# Patient Record
Sex: MALE | Race: OTHER | HISPANIC OR LATINO
[De-identification: names, ages, dates, MRNs, and addresses within clinical notes are randomized per-mention and may not be internally consistent; named-entity substitution may affect disease eponyms.]

---

## 2021-06-07 ENCOUNTER — LABORATORY RESULT (OUTPATIENT)
Age: 66
End: 2021-06-07

## 2021-06-07 ENCOUNTER — NON-APPOINTMENT (OUTPATIENT)
Age: 66
End: 2021-06-07

## 2021-06-07 ENCOUNTER — APPOINTMENT (OUTPATIENT)
Dept: CARDIOLOGY | Facility: CLINIC | Age: 66
End: 2021-06-07
Payer: MEDICAID

## 2021-06-07 VITALS
HEART RATE: 84 BPM | SYSTOLIC BLOOD PRESSURE: 120 MMHG | HEIGHT: 66 IN | WEIGHT: 180 LBS | BODY MASS INDEX: 28.93 KG/M2 | TEMPERATURE: 97.6 F | DIASTOLIC BLOOD PRESSURE: 70 MMHG | OXYGEN SATURATION: 97 %

## 2021-06-07 DIAGNOSIS — I10 ESSENTIAL (PRIMARY) HYPERTENSION: ICD-10-CM

## 2021-06-07 DIAGNOSIS — Z00.00 ENCOUNTER FOR GENERAL ADULT MEDICAL EXAMINATION W/OUT ABNORMAL FINDINGS: ICD-10-CM

## 2021-06-07 PROCEDURE — 99204 OFFICE O/P NEW MOD 45 MIN: CPT

## 2021-06-07 PROCEDURE — 93000 ELECTROCARDIOGRAM COMPLETE: CPT

## 2021-06-07 PROCEDURE — 36415 COLL VENOUS BLD VENIPUNCTURE: CPT

## 2021-06-08 LAB
ALBUMIN SERPL ELPH-MCNC: 4.4 G/DL
ALP BLD-CCNC: 76 U/L
ALT SERPL-CCNC: 17 U/L
ANION GAP SERPL CALC-SCNC: 12 MMOL/L
AST SERPL-CCNC: 19 U/L
BASOPHILS # BLD AUTO: 0.19 K/UL
BASOPHILS NFR BLD AUTO: 1.7 %
BILIRUB SERPL-MCNC: 0.4 MG/DL
BUN SERPL-MCNC: 15 MG/DL
CALCIUM SERPL-MCNC: 9.7 MG/DL
CHLORIDE SERPL-SCNC: 103 MMOL/L
CHOLEST SERPL-MCNC: 174 MG/DL
CO2 SERPL-SCNC: 22 MMOL/L
CREAT SERPL-MCNC: 0.7 MG/DL
EOSINOPHIL # BLD AUTO: 3.05 K/UL
EOSINOPHIL NFR BLD AUTO: 27.3 %
ESTIMATED AVERAGE GLUCOSE: 114 MG/DL
GLUCOSE SERPL-MCNC: 82 MG/DL
HBA1C MFR BLD HPLC: 5.6 %
HCT VFR BLD CALC: 45.8 %
HDLC SERPL-MCNC: 47 MG/DL
HGB BLD-MCNC: 14.8 G/DL
LDLC SERPL CALC-MCNC: 96 MG/DL
LYMPHOCYTES # BLD AUTO: 2.77 K/UL
LYMPHOCYTES NFR BLD AUTO: 24.8 %
MAN DIFF?: NORMAL
MCHC RBC-ENTMCNC: 30 PG
MCHC RBC-ENTMCNC: 32.3 GM/DL
MCV RBC AUTO: 92.9 FL
MONOCYTES # BLD AUTO: 0.48 K/UL
MONOCYTES NFR BLD AUTO: 4.3 %
NEUTROPHILS # BLD AUTO: 4.68 K/UL
NEUTROPHILS NFR BLD AUTO: 41.9 %
NONHDLC SERPL-MCNC: 127 MG/DL
PLATELET # BLD AUTO: 264 K/UL
POTASSIUM SERPL-SCNC: 4.2 MMOL/L
PROT SERPL-MCNC: 7.6 G/DL
RBC # BLD: 4.93 M/UL
RBC # FLD: 13.2 %
SODIUM SERPL-SCNC: 137 MMOL/L
T4 SERPL-MCNC: 4.8 UG/DL
TRIGL SERPL-MCNC: 156 MG/DL
TSH SERPL-ACNC: 1.3 UIU/ML
WBC # FLD AUTO: 11.17 K/UL

## 2023-03-15 ENCOUNTER — LABORATORY RESULT (OUTPATIENT)
Age: 68
End: 2023-03-15

## 2023-03-15 ENCOUNTER — NON-APPOINTMENT (OUTPATIENT)
Age: 68
End: 2023-03-15

## 2023-03-15 ENCOUNTER — APPOINTMENT (OUTPATIENT)
Dept: INTERNAL MEDICINE | Facility: CLINIC | Age: 68
End: 2023-03-15
Payer: COMMERCIAL

## 2023-03-15 VITALS
OXYGEN SATURATION: 99 % | DIASTOLIC BLOOD PRESSURE: 80 MMHG | SYSTOLIC BLOOD PRESSURE: 122 MMHG | WEIGHT: 180 LBS | HEART RATE: 64 BPM | RESPIRATION RATE: 16 BRPM | TEMPERATURE: 98 F | HEIGHT: 66 IN | BODY MASS INDEX: 28.93 KG/M2

## 2023-03-15 DIAGNOSIS — Z12.5 ENCOUNTER FOR SCREENING FOR MALIGNANT NEOPLASM OF PROSTATE: ICD-10-CM

## 2023-03-15 DIAGNOSIS — N40.1 BENIGN PROSTATIC HYPERPLASIA WITH LOWER URINARY TRACT SYMPMS: ICD-10-CM

## 2023-03-15 DIAGNOSIS — Z00.00 ENCOUNTER FOR GENERAL ADULT MEDICAL EXAMINATION W/OUT ABNORMAL FINDINGS: ICD-10-CM

## 2023-03-15 DIAGNOSIS — N13.8 BENIGN PROSTATIC HYPERPLASIA WITH LOWER URINARY TRACT SYMPMS: ICD-10-CM

## 2023-03-15 DIAGNOSIS — Z82.49 FAMILY HISTORY OF ISCHEMIC HEART DISEASE AND OTHER DISEASES OF THE CIRCULATORY SYSTEM: ICD-10-CM

## 2023-03-15 DIAGNOSIS — R35.0 FREQUENCY OF MICTURITION: ICD-10-CM

## 2023-03-15 DIAGNOSIS — R06.83 SNORING: ICD-10-CM

## 2023-03-15 DIAGNOSIS — Z92.29 PERSONAL HISTORY OF OTHER DRUG THERAPY: ICD-10-CM

## 2023-03-15 DIAGNOSIS — Z13.1 ENCOUNTER FOR SCREENING FOR DIABETES MELLITUS: ICD-10-CM

## 2023-03-15 PROCEDURE — 99397 PER PM REEVAL EST PAT 65+ YR: CPT | Mod: 25

## 2023-03-15 PROCEDURE — G0444 DEPRESSION SCREEN ANNUAL: CPT | Mod: 59

## 2023-03-15 PROCEDURE — 36415 COLL VENOUS BLD VENIPUNCTURE: CPT

## 2023-03-15 PROCEDURE — 93000 ELECTROCARDIOGRAM COMPLETE: CPT | Mod: 59

## 2023-03-15 PROCEDURE — 90677 PCV20 VACCINE IM: CPT

## 2023-03-15 PROCEDURE — 99214 OFFICE O/P EST MOD 30 MIN: CPT | Mod: 25

## 2023-03-15 PROCEDURE — G0009: CPT

## 2023-03-15 RX ORDER — SODIUM BICARBONATE 650 MG/1
650 TABLET ORAL DAILY
Refills: 0 | Status: ACTIVE | COMMUNITY

## 2023-03-15 RX ORDER — OMEPRAZOLE 40 MG/1
40 CAPSULE, DELAYED RELEASE ORAL
Qty: 30 | Refills: 0 | Status: ACTIVE | COMMUNITY

## 2023-03-15 RX ORDER — TOLTERODINE TARTRATE 4 MG/1
4 CAPSULE, EXTENDED RELEASE ORAL DAILY
Refills: 0 | Status: ACTIVE | COMMUNITY

## 2023-03-22 ENCOUNTER — NON-APPOINTMENT (OUTPATIENT)
Age: 68
End: 2023-03-22

## 2023-03-22 LAB
ALBUMIN SERPL ELPH-MCNC: 4.6 G/DL
ALP BLD-CCNC: 77 U/L
ALT SERPL-CCNC: 21 U/L
ANION GAP SERPL CALC-SCNC: 12 MMOL/L
APPEARANCE: CLEAR
AST SERPL-CCNC: 18 U/L
BACTERIA UR CULT: NORMAL
BACTERIA: NEGATIVE
BASOPHILS # BLD AUTO: 0.07 K/UL
BASOPHILS NFR BLD AUTO: 0.7 %
BILIRUB SERPL-MCNC: 0.3 MG/DL
BILIRUBIN URINE: NEGATIVE
BLOOD URINE: ABNORMAL
BUN SERPL-MCNC: 13 MG/DL
CALCIUM SERPL-MCNC: 9.6 MG/DL
CHLORIDE SERPL-SCNC: 104 MMOL/L
CHOLEST SERPL-MCNC: 211 MG/DL
CO2 SERPL-SCNC: 23 MMOL/L
COLOR: YELLOW
CREAT SERPL-MCNC: 0.65 MG/DL
EGFR: 105 ML/MIN/1.73M2
EOSINOPHIL # BLD AUTO: 2.62 K/UL
EOSINOPHIL NFR BLD AUTO: 25.2 %
ESTIMATED AVERAGE GLUCOSE: 114 MG/DL
GLUCOSE QUALITATIVE U: NEGATIVE
GLUCOSE SERPL-MCNC: 96 MG/DL
HBA1C MFR BLD HPLC: 5.6 %
HCT VFR BLD CALC: 45.6 %
HDLC SERPL-MCNC: 45 MG/DL
HGB BLD-MCNC: 14.5 G/DL
HYALINE CASTS: 0 /LPF
IMM GRANULOCYTES NFR BLD AUTO: 0.3 %
KETONES URINE: NEGATIVE
LDLC SERPL CALC-MCNC: 134 MG/DL
LEUKOCYTE ESTERASE URINE: NEGATIVE
LYMPHOCYTES # BLD AUTO: 2.89 K/UL
LYMPHOCYTES NFR BLD AUTO: 27.8 %
MAN DIFF?: NORMAL
MCHC RBC-ENTMCNC: 30 PG
MCHC RBC-ENTMCNC: 31.8 GM/DL
MCV RBC AUTO: 94.2 FL
MICROSCOPIC-UA: NORMAL
MONOCYTES # BLD AUTO: 0.54 K/UL
MONOCYTES NFR BLD AUTO: 5.2 %
NEUTROPHILS # BLD AUTO: 4.23 K/UL
NEUTROPHILS NFR BLD AUTO: 40.8 %
NITRITE URINE: NEGATIVE
NONHDLC SERPL-MCNC: 166 MG/DL
PH URINE: 6
PLATELET # BLD AUTO: 193 K/UL
POTASSIUM SERPL-SCNC: 4.6 MMOL/L
PROT SERPL-MCNC: 7.2 G/DL
PROTEIN URINE: NORMAL
PSA FREE FLD-MCNC: 14 %
PSA FREE SERPL-MCNC: 0.13 NG/ML
PSA SERPL-MCNC: 0.98 NG/ML
RBC # BLD: 4.84 M/UL
RBC # FLD: 12.7 %
RED BLOOD CELLS URINE: 2 /HPF
SODIUM SERPL-SCNC: 139 MMOL/L
SPECIFIC GRAVITY URINE: 1.03
SQUAMOUS EPITHELIAL CELLS: 0 /HPF
TRIGL SERPL-MCNC: 159 MG/DL
UROBILINOGEN URINE: NORMAL
WBC # FLD AUTO: 10.38 K/UL
WHITE BLOOD CELLS URINE: 3 /HPF

## 2023-04-13 ENCOUNTER — NON-APPOINTMENT (OUTPATIENT)
Age: 68
End: 2023-04-13

## 2023-04-14 ENCOUNTER — APPOINTMENT (OUTPATIENT)
Dept: HEMATOLOGY ONCOLOGY | Facility: CLINIC | Age: 68
End: 2023-04-14
Payer: COMMERCIAL

## 2023-04-14 ENCOUNTER — RESULT REVIEW (OUTPATIENT)
Age: 68
End: 2023-04-14

## 2023-04-14 VITALS
OXYGEN SATURATION: 97 % | SYSTOLIC BLOOD PRESSURE: 124 MMHG | RESPIRATION RATE: 16 BRPM | WEIGHT: 184 LBS | HEIGHT: 66 IN | HEART RATE: 62 BPM | TEMPERATURE: 97.5 F | BODY MASS INDEX: 29.57 KG/M2 | DIASTOLIC BLOOD PRESSURE: 80 MMHG

## 2023-04-14 PROCEDURE — 99205 OFFICE O/P NEW HI 60 MIN: CPT | Mod: 25

## 2023-04-14 PROCEDURE — 36415 COLL VENOUS BLD VENIPUNCTURE: CPT

## 2023-04-14 RX ORDER — AMLODIPINE BESYLATE 5 MG/1
5 TABLET ORAL
Refills: 0 | Status: ACTIVE | COMMUNITY

## 2023-04-14 RX ORDER — TAMSULOSIN HYDROCHLORIDE 0.4 MG/1
0.4 CAPSULE ORAL DAILY
Refills: 0 | Status: COMPLETED | COMMUNITY
End: 2023-04-14

## 2023-04-14 RX ORDER — TAMSULOSIN HYDROCHLORIDE 0.4 MG/1
0.4 CAPSULE ORAL
Refills: 0 | Status: ACTIVE | COMMUNITY

## 2023-04-16 NOTE — HISTORY OF PRESENT ILLNESS
[de-identified] : 68 year old male, born in  presents today for initial consultation of eosinophilia, referred by Dr. Margarette Cabrera.  Labs dated 3/22/23 shows Eosinophils 25.2% and absolute Eosinophils 2600.  This has been previously elevated on a CBC dated June 2021.  He reports during the lab draw he had an abdominal rash.  He denies any unexplained weight loss,  lymphadenopathy, or fevers.  He does admit to night sweats x a few months.  He reports no allergies.  He reports WBC back in 2020 over 50 million.  He did see a "blood" specialist in [facility].  When asked if he was diagnosed with a hematological disorder he states he was told he is allergic to Clorox.  \par Family Hx-\par Denies any family hx of malignancies or hematological diseases\par \par Social Hx-\par Former smoker, quit 20 yrs ago- smoked x 30 1-2PPD\par Rarely uses ETOH \par Denies illicit drug use\par Works [employer] as a  \par \par Aleida Dumont: # [id number]\par

## 2023-04-16 NOTE — ASSESSMENT
[FreeTextEntry1] : 68 year old Thai speaking male from DR. \par  Tim [id number].\par Patient referred for evaluation of eosinophilia.  \par # hematology - CBC otherwise WNL\par # Cardiac - Denies SOB, edema\par # CNS, dermatologic, GI denies\par # pulmonary - denies\par # No evidence by history of autoimmune process\par \par labs send out for FIPL1-PDGFR fusion, TCR, flow.\par WIll plan for bone marrow biopsy\par # BPH- Urinary retention, difficulty passing urine. Seen by urologist, given flomax but not compliant\par # GERD- Omeprazole\par \par

## 2023-05-01 ENCOUNTER — APPOINTMENT (OUTPATIENT)
Dept: HEMATOLOGY ONCOLOGY | Facility: CLINIC | Age: 68
End: 2023-05-01
Payer: COMMERCIAL

## 2023-05-01 VITALS
TEMPERATURE: 96.9 F | OXYGEN SATURATION: 96 % | HEIGHT: 66 IN | RESPIRATION RATE: 16 BRPM | WEIGHT: 185.5 LBS | BODY MASS INDEX: 29.81 KG/M2 | DIASTOLIC BLOOD PRESSURE: 76 MMHG | SYSTOLIC BLOOD PRESSURE: 129 MMHG | HEART RATE: 68 BPM

## 2023-05-01 PROCEDURE — 99214 OFFICE O/P EST MOD 30 MIN: CPT | Mod: 25

## 2023-05-01 PROCEDURE — 36415 COLL VENOUS BLD VENIPUNCTURE: CPT

## 2023-05-01 NOTE — ASSESSMENT
[FreeTextEntry1] : 68 year old [place] speaking male from [place] \par  Yanira [id number] \par Patient referred for evaluation of eosinophilia.  \par # hematology - CBC otherwise WNL\par # Cardiac - Denies SOB, edema\par # CNS, dermatologic, GI denies\par # pulmonary - denies\par # No evidence by history of autoimmune process\par \par IgE significant elevation 2354\par \par labs send out for FIPL1-PDGFR fusion, TCR, flow negative mutations\par WIll plan for bone marrow biopsy\par # BPH- Urinary retention, difficulty passing urine. Seen by urologist, given flomax but not compliant\par # GERD- Omeprazole\par \par # reviewed labs - elevate IgE, schedule bone marrow biopsy.

## 2023-05-01 NOTE — HISTORY OF PRESENT ILLNESS
[de-identified] : 68 year old male, born in  presents today for initial consultation of eosinophilia, referred by Dr. Margarette Cabrera.  Labs dated 3/22/23 shows Eosinophils 25.2% and absolute Eosinophils 2600.  This has been previously elevated on a CBC dated June 2021.  He reports during the lab draw he had an abdominal rash.  He denies any unexplained weight loss,  lymphadenopathy, or fevers.  He does admit to night sweats x a few months.  He reports no allergies.  He reports WBC back in 2020 over 50 million.  He did see a "blood" specialist in [facility].  When asked if he was diagnosed with a hematological disorder he states he was told he is allergic to Clorox.  \par Family Hx-\par Denies any family hx of malignancies or hematological diseases\par \par Social Hx-\par Former smoker, quit 20 yrs ago- smoked x 30 1-2PPD\par Rarely uses ETOH \par Denies illicit drug use\par Works FT as a  \par \par Aleida Dumont: # [id number]\par

## 2023-05-08 ENCOUNTER — APPOINTMENT (OUTPATIENT)
Dept: UROLOGY | Facility: CLINIC | Age: 68
End: 2023-05-08

## 2023-05-25 ENCOUNTER — RESULT REVIEW (OUTPATIENT)
Age: 68
End: 2023-05-25

## 2023-05-26 ENCOUNTER — RESULT REVIEW (OUTPATIENT)
Age: 68
End: 2023-05-26

## 2023-05-26 ENCOUNTER — APPOINTMENT (OUTPATIENT)
Dept: HEMATOLOGY ONCOLOGY | Facility: CLINIC | Age: 68
End: 2023-05-26
Payer: COMMERCIAL

## 2023-05-26 VITALS
OXYGEN SATURATION: 96 % | HEIGHT: 65 IN | SYSTOLIC BLOOD PRESSURE: 127 MMHG | WEIGHT: 188 LBS | TEMPERATURE: 97.2 F | RESPIRATION RATE: 16 BRPM | DIASTOLIC BLOOD PRESSURE: 81 MMHG | HEART RATE: 62 BPM | BODY MASS INDEX: 31.32 KG/M2

## 2023-05-26 PROCEDURE — 99214 OFFICE O/P EST MOD 30 MIN: CPT | Mod: 25

## 2023-05-26 PROCEDURE — 38222 DX BONE MARROW BX & ASPIR: CPT | Mod: RT

## 2023-05-26 PROCEDURE — 36415 COLL VENOUS BLD VENIPUNCTURE: CPT

## 2023-06-04 NOTE — HISTORY OF PRESENT ILLNESS
[de-identified] : 68 year old male, born in  presents today for initial consultation of eosinophilia, referred by Dr. Margarette Cabrera.  Labs dated 3/22/23 shows Eosinophils 25.2% and absolute Eosinophils 2600.  This has been previously elevated on a CBC dated June 2021.  He reports during the lab draw he had an abdominal rash.  He denies any unexplained weight loss,  lymphadenopathy, or fevers.  He does admit to night sweats x a few months.  He reports no allergies.  He reports WBC back in 2020 over 50 million.  He did see a "blood" specialist in [facility].  When asked if he was diagnosed with a hematological disorder he states he was told he is allergic to Clorox.  \par Family Hx-\par Denies any family hx of malignancies or hematological diseases\par \par Social Hx-\par Former smoker, quit 20 yrs ago- smoked x 30 1-2PPD\par Rarely uses ETOH \par Denies illicit drug use\par Works FT as a  \par \par Aleida Dumont: # [id number]\par  [de-identified] : Natalia [id number]

## 2023-06-04 NOTE — ASSESSMENT
[FreeTextEntry1] : 68 year old Kazakh speaking male from DR. \par  Yanira [id number] \par Patient referred for evaluation of eosinophilia.  \par # hematology - CBC otherwise WNL\par # Cardiac - Denies SOB, edema\par # CNS, dermatologic, GI denies\par # pulmonary - denies\par # No evidence by history of autoimmune process\par \par IgE significant elevation 2354\par bone marrow biopsy today - right PIC,\par reviewed results \par labs send out for FIPL1-PDGFR fusion, TCR, flow negative mutations\par WIll plan for bone marrow biopsy\par # BPH- Urinary retention, difficulty passing urine. Seen by urologist, given flomax but not compliant\par # GERD- Omeprazole\par \par # reviewed labs - elevate IgE, schedule bone marrow biopsy.

## 2023-06-04 NOTE — PROCEDURE
[Bone Marrow Biopsy] : bone marrow biopsy [Bone Marrow Aspiration] : bone marrow aspiration  [Patient] : the patient [Verbal Consent Obtained] : verbal consent was obtained prior to the procedure [Patient identification verified] : patient identification verified [Procedure verified and consent obtained] : procedure verified and consent obtained [Laterality verified and correct site marked] : laterality verified and correct site marked [Correct implant and/ or special equipment obtained] : correct impact and/ or special equipment obtained [Prone] : prone [Superior iliac spine was identified] : the superior iliac spine was identified. [The right posterior iliac crest was prepped with betadine and draped, using sterile technique.] : The right posterior iliac crest was prepped with betadine and draped, using sterile technique. [Lidocaine was injected and into the periosteum overlying the site.] : Lidocaine was injected and into the periosteum overlying the site. [Aspirate] : aspirate [Cytogenetics] : cytogenetics [FISH] : FISH [Biopsy] : biopsy [Flow Cytometry] : flow cytometry [] : The patient was instructed to remove the bandage the following AM. The patient may bathe. Acetaminophen may be taken for discomfort, as per package directions.If there are any other problems, the patient was instructed to call the office. The patient verbalized understanding, and is aware of the office contact numbers. [FreeTextEntry1] : Eosinophilia

## 2023-06-08 DIAGNOSIS — D72.119 HYPEREOSINOPHILIC SYNDROME [HES], UNSPECIFIED: ICD-10-CM

## 2023-06-08 DIAGNOSIS — R76.8 OTHER SPECIFIED ABNORMAL IMMUNOLOGICAL FINDINGS IN SERUM: ICD-10-CM

## 2023-06-08 DIAGNOSIS — E78.5 HYPERLIPIDEMIA, UNSPECIFIED: ICD-10-CM

## 2023-06-08 DIAGNOSIS — R53.83 OTHER FATIGUE: ICD-10-CM

## 2023-06-08 DIAGNOSIS — Z86.2 PERSONAL HISTORY OF DISEASES OF THE BLOOD AND BLOOD-FORMING ORGANS AND CERTAIN DISORDERS INVOLVING THE IMMUNE MECHANISM: ICD-10-CM

## 2023-06-08 DIAGNOSIS — R89.8 OTHER ABNORMAL FINDINGS IN SPECIMENS FROM OTHER ORGANS, SYSTEMS AND TISSUES: ICD-10-CM

## 2023-06-14 ENCOUNTER — APPOINTMENT (OUTPATIENT)
Dept: HEMATOLOGY ONCOLOGY | Facility: CLINIC | Age: 68
End: 2023-06-14

## 2023-07-28 NOTE — HISTORY OF PRESENT ILLNESS
[de-identified] : 68 year old male, born in  presents today for initial consultation of eosinophilia, referred by Dr. Margarette Cabrera.  Labs dated 3/22/23 shows Eosinophils 25.2% and absolute Eosinophils 2600.  This has been previously elevated on a CBC dated June 2021.  He reports during the lab draw he had an abdominal rash.  He denies any unexplained weight loss,  lymphadenopathy, or fevers.  He does admit to night sweats x a few months.  He reports no allergies.  He reports WBC back in 2020 over 50 million.  He did see a "blood" specialist in [facility].  When asked if he was diagnosed with a hematological disorder he states he was told he is allergic to Clorox.  \par Family Hx-\par Denies any family hx of malignancies or hematological diseases\par \par Social Hx-\par Former smoker, quit 20 yrs ago- smoked x 30 1-2PPD\par Rarely uses ETOH \par Denies illicit drug use\par Works [employer] as a  \par \par Aleida Dumont: # [id number]\par  [de-identified] : intreperterer needed\par follow up today Eosinophilia

## 2023-07-28 NOTE — ASSESSMENT
[FreeTextEntry1] : 68 year old Urdu speaking male from [place]. \par  Yanira [id number] \par Patient referred for evaluation of eosinophilia.  \par # hematology - CBC otherwise WNL\par # Cardiac - Denies SOB, edema\par # CNS, dermatologic, GI denies\par # pulmonary - denies\par # No evidence by history of autoimmune process\par \par IgE significant elevation 2354\par bone marrow biopsy today - right PIC,\par reviewed results \par labs send out for FIPL1-PDGFR fusion, TCR, flow negative mutations\par WIll plan for bone marrow biopsy\par # BPH- Urinary retention, difficulty passing urine. Seen by urologist, given flomax but not compliant\par # GERD- Omeprazole\par \par # reviewed labs - elevate IgE, schedule bone marrow biopsy.

## 2023-08-04 ENCOUNTER — APPOINTMENT (OUTPATIENT)
Dept: HEMATOLOGY ONCOLOGY | Facility: CLINIC | Age: 68
End: 2023-08-04